# Patient Record
Sex: FEMALE | Race: WHITE | Employment: STUDENT | URBAN - METROPOLITAN AREA
[De-identification: names, ages, dates, MRNs, and addresses within clinical notes are randomized per-mention and may not be internally consistent; named-entity substitution may affect disease eponyms.]

---

## 2020-01-09 ENCOUNTER — PROCEDURE VISIT (OUTPATIENT)
Dept: SPORTS MEDICINE | Age: 20
End: 2020-01-09

## 2020-01-09 ASSESSMENT — PAIN SCALES - GENERAL: PAINLEVEL_OUTOF10: 4

## 2020-01-13 ENCOUNTER — PROCEDURE VISIT (OUTPATIENT)
Dept: SPORTS MEDICINE | Age: 20
End: 2020-01-13

## 2020-01-14 ENCOUNTER — OFFICE VISIT (OUTPATIENT)
Dept: ORTHOPEDIC SURGERY | Age: 20
End: 2020-01-14
Payer: COMMERCIAL

## 2020-01-14 VITALS — HEIGHT: 60 IN | BODY MASS INDEX: 23.16 KG/M2 | WEIGHT: 118 LBS

## 2020-01-14 PROCEDURE — 99203 OFFICE O/P NEW LOW 30 MIN: CPT | Performed by: ORTHOPAEDIC SURGERY

## 2020-01-14 RX ORDER — METHYLPREDNISOLONE 4 MG/1
TABLET ORAL
Qty: 1 KIT | Refills: 0 | Status: SHIPPED | OUTPATIENT
Start: 2020-01-14

## 2020-01-14 NOTE — PROGRESS NOTES
Chief Complaint    Hip Pain (left hip)      History of Present Illness:  Navi Paez is a 20 y.o. female. She is here today for evaluation of her left hip. She is a  here at [employer], [place] and she is had left hip pain that is been somewhat chronic but has gotten worse as of recently and changed in character. She states that she always has some anterior hip pain bilaterally with activity. As of recently she started having more pain over the lateral side of the hip. Pain is there with activity especially while she is figure skating. She denies radicular pain. Denies numbness or tingling. Denies any history of injury to the hips           Medical History:  Patient's medications, allergies, past medical, surgical, social and family histories were reviewed and updated as appropriate. Review of Systems:  Pertinent items are noted in HPI  Review of systems reviewed from Patient History Form dated on 1/14/20 and available in the patient's chart under the Media tab. Vital Signs:  Ht 5' (1.524 m)   Wt 118 lb (53.5 kg)   BMI 23.05 kg/m²     General Exam:   Constitutional: Patient is adequately groomed with no evidence of malnutrition  DTRs: Deep tendon reflexes are intact  Mental Status: The patient is oriented to time, place and person. The patient's mood and affect are appropriate. Hip Examination:    Inspection: No significant swelling erythema noted about the left hip today    Palpation: There is tenderness palpation over the trochanteric bursa of the left hip. No tenderness along the iliac crest.    Range of Motion: Passive hip flexion today is 120 degrees. She has good passive internal and external rotation. Strength: She does have good strength with resisted hip flexion as well as resisted abduction adduction    Special Tests: Negative Stinchfield.   Negative straight leg raise exam.  There is pain reproduction with hip impingement testing on the left hip    Skin: There are no rashes, ulcerations or lesions. Gait: Normal    Additional Comments:       Additional Examinations:         Right Lower Extremity: Examination of the right lower extremity does not show any tenderness, deformity or injury. Range of motion is unremarkable. There is no gross instability. There are no rashes, ulcerations or lesions. Strength and tone are normal.    Radiology:     X-rays obtained and reviewed in office:  Views 2 views left hip does demonstrate findings consistent with hip impingement with small cam lesion along the femoral head neck junction bilaterally and small pincer lesion along the acetabulum         Assessment : Left hip impingement, trochanteric bursitis    Impression:  Encounter Diagnoses   Name Primary?  Left hip pain Yes    Hip impingement syndrome, left     Trochanteric bursitis of left hip        Office Procedures:  Orders Placed This Encounter   Procedures    XR HIP LEFT (2-3 VIEWS)     Standing Status:   Future     Standing Expiration Date:   1/14/2021    Ambulatory referral to Physical Therapy     Referral Priority:   Routine     Referral Type:   Eval and Treat     Referral Reason:   Specialty Services Required     Requested Specialty:   Physical Therapy     Number of Visits Requested:   1       Treatment Plan: I discussed the diagnosis and treatment options with her today. She has a big tournament coming up this coming weekend that she would like to skate and therefore I am going to place her on to JORDY/ Paulie Beebe [address]. I am also going to refer her into physical therapy at this time. She may continue to skate as she can tolerate with her symptoms.   I would like to see her back in clinic in 6 weeks for follow-up

## 2020-01-14 NOTE — LETTER
[facility]  [address] [address]  Phone: [phone]  Fax: [phone]    Jacey Christine MD        January 14, 2020     Patient: Milton Boss   YOB: 2000   Date of Visit: 1/14/2020       To Whom It May Concern: It is my medical opinion that Milton Boss may continue ice skating. If you have any questions or concerns, please don't hesitate to call.     Sincerely,              Jacey Christien MD

## 2020-01-16 ENCOUNTER — PROCEDURE VISIT (OUTPATIENT)
Dept: SPORTS MEDICINE | Age: 20
End: 2020-01-16

## 2020-01-16 NOTE — PROGRESS NOTES
Subjective:      Patient ID: Ulises Andrade is a 20 y.o. female. Mary Martinez comes in today for a follow up to her Left hip pain. She was evaluated by Dr. Nidhi Marshall 2 days ago and diagnosed her with left hip impingement. She was perscribed an oral steroid which she reports has been helping reduce the pain and discomfort. Yesterday she reports shooting pain down into her knee from her hip but that has resolved today. She has a big competition this upcoming weekend so we will begin rehab exercises next week. We would like Bhargav to complete physical therapy with us and over at the physical therapy office once classes begin. Hip Pain          Review of Systems    Objective:   Physical Exam    Assessment:      Hip Impingement      Plan: Today we completed 20 minutes of electrical stimulation and ice. She tolerated treatment well. Continue to complete the remainder of her oral steroid. Begin formal therapy once classes begin in 2 weeks.          Nicki Gallagher

## 2020-01-31 ENCOUNTER — PROCEDURE VISIT (OUTPATIENT)
Dept: SPORTS MEDICINE | Age: 20
End: 2020-01-31

## 2020-01-31 NOTE — PROGRESS NOTES
Subjective:      Patient ID: Marni Mercer is a 20 y.o. female. Christopher Jacob comes in today for therapeutic exercises for her left hip impingement. She reports that her home PT worked a lot on loosening up her hip but that her joint pain is still present. She took about a week and a half off of skating. Right now she is only doing footwork and choreography but plans to start jumping soon since she wants to compete the first weekend of march. HPI    Review of Systems    Objective:   Physical Exam    Assessment:      L hip impingement      Plan: Today Bhargav did:  Bike 5 mins  Band assisted posterior hip mobs 2x10 each  [place] glute activation  Stool figure 4 stretch  Soft tissue massage around L trochanter    She attends her first PT session at [facility] on Tuesday. She will come in as needed on the days she is not at PT.         Tristan Armijo

## 2020-02-04 ENCOUNTER — HOSPITAL ENCOUNTER (OUTPATIENT)
Dept: PHYSICAL THERAPY | Age: 20
Setting detail: THERAPIES SERIES
Discharge: HOME | End: 2020-02-04
Payer: COMMERCIAL

## 2020-02-04 PROCEDURE — 97112 NEUROMUSCULAR REEDUCATION: CPT

## 2020-02-04 PROCEDURE — 97140 MANUAL THERAPY 1/> REGIONS: CPT

## 2020-02-04 PROCEDURE — 97161 PT EVAL LOW COMPLEX 20 MIN: CPT

## 2020-02-04 NOTE — PLAN OF CARE
[facility]  Phone: [phone]   Fax:     [phone]                                                       Physical Therapy Certification    Dear Referring Practitioner: Dr. Lalita Olmstead,    We had the pleasure of evaluating the following patient for physical therapy services at [address] [place] [address]. A summary of our findings can be found in the initial assessment below. This includes our plan of care. If you have any questions or concerns regarding these findings, please do not hesitate to contact me at the office phone number checked above. Thank you for the referral.       Physician Signature:_______________________________Date:__________________  By signing above (or electronic signature), therapists plan is approved by physician      Patient: Lori Pena   : 2000   MRN: [id number]  Referring Physician: Referring Practitioner: Dr. Lalita Olmstead      Evaluation Date: 2020      Medical Diagnosis Information:  Diagnosis: M25.852 Hip impingement syndrome; M70.62 Trochanteric bursitis of L hip   Treatment Diagnosis: L hip pain, decreased LLE strength                                         Insurance information: PT Insurance Information: BCBS; 60 visits; 90/10%     Precautions/ Contra-indications: n/a  Latex Allergy:  [x]NO      []YES  Preferred Language for Healthcare:   [x]English       []other:    SUBJECTIVE: Patient stated complaint: Pt reports for evaluation of L hip. 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 States at the beginning of January she fell hard after a jump and started having increased pain anterior and laterally even at rest just sitting or when she is standing/walking for too long. States she is still skating to tolerance but has not performed any jumps/turns in a few weeks.  Was getting PT back at home w/ ultrasound, stim, and some manual stretching that has helped pain decrease. States she has not had the anterior hip pain much the last few weeks and pain is primarily lateral hip over the greater trochanter, worse to touch. Pt states she falls a lot to her L side when skating which doesn't help. Relevant Medical History: n/a  Functional Scale/Score: LEFS: 55/80    Pain Scale: 2-3/10 currently, 7-8/10 w/ walking/skating  Easing factors: ice/heat, STM  Provocative factors: squatting, walking/skating    Type: []Constant   [x]Intermittent  []Radiating []Localized []other:     Numbness/Tingling: pt denies    Occupation/School: student     Living Status/Prior Level of Function: Independent with ADLs and IADLs    OBJECTIVE:     ROM LEFT RIGHT   HIP Flex WFL; p! End range & w/ OP WFL   HIP Abd [facility] WFL   HIP Ext [facility] WFL   HIP IR WFL WFL   Hip ER [facility] WFL   Knee ext     Knee Flex     Ankle PF     Ankle DF     Ankle In     Ankle Ev     Strength  LEFT RIGHT   HIP Flexors 4+ 5   HIP Abductors 4- 4   HIP Ext 4 4   Knee EXT (quad) 4+ 5   Knee Flex (HS) 5 5   Ankle DF 5 5   Ankle PF 5 5     Reflexes/Sensation:    [x]Dermatomes/Myotomes intact    [x]Reflexes equal and normal bilaterally   []Other:    Joint mobility: L hip; lumbar cleared w/o referral   []Normal    [x]Hypo   []Hyper    Palpation: TTP L greater trochanter, L glute med, L piriformis    Functional Mobility/Transfers: full squat w/ increased tightness lateral hip    Posture: WFL    Bandages/Dressings/Incisions: n/a    Gait: (include devices/WB status) WFL; decreased L hip extension and R pelvic drop    Orthopedic Special Tests:   + RAISSA, ER w/ OP,                        [x] Patient history, allergies, meds reviewed. Medical chart reviewed. See intake form. Review Of Systems (ROS):  [x]Performed Review of systems (Integumentary, CardioPulmonary, Neurological) by intake and observation. Intake form has been scanned into medical record.  Patient has been response w/ STM to glute med and piriformis and decreased pain w/ passive motion following inferior and lateral hip mobilizations. Quick fatigue w/ glute facilitation exercises prescribed for HEP. Pt to benefit from skilled PT to improve L hip mobility, flexibility, and glute strengthening and facilitation to improve stability and return to sport. Functional Impairments:     [x]Noted lumbar/proximal hip/LE hypomobility   []Decreased LE functional ROM   [x]Decreased core/proximal hip strength and neuromuscular control   [x]Decreased LE functional strength   []Reduced balance/proprioceptive control   []other:      Functional Activity Limitations (from functional questionnaire and intake)   [x]Reduced ability to tolerate prolonged functional positions   []Reduced ability or difficulty with changes of positions or transfers between positions   []Reduced ability to maintain good posture and demonstrate good body mechanics with sitting, bending, and lifting   []Reduced ability to sleep   [] Reduced ability or tolerance with driving and/or computer work   []Reduced ability to perform lifting, carrying tasks   [x]Reduced ability to squat   []Reduced ability to forward bend   [x]Reduced ability to ambulate prolonged functional periods/distances/surfaces   [x]Reduced ability to ascend/descend stairs   [x]Reduced ability to run, hop or jump   []other:     Participation Restrictions   []Reduced participation in self care activities   []Reduced participation in home management activities   []Reduced participation in work activities   []Reduced participation in social activities. [x]Reduced participation in sport activities. Classification :    []Signs/symptoms consistent with post-surgical status including decreased ROM, strength and function.    []Signs/symptoms consistent with joint sprain/strain   []Signs/symptoms consistent with patella-femoral syndrome   []Signs/symptoms consistent with knee OA/hip to include: Dry Needling/IASTM, STM, PROM, Gr I-IV mobilizations, manipulation. [x] Modalities as needed that may include: thermal agents, E-stim, Biofeedback, US, iontophoresis as indicated  [x] Patient education on joint protection, postural re-education, activity modification, progression of HEP. HEP instruction: (see scanned forms)    GOALS:  Patient stated goal: \"be able to jump/spin without pain\"  [] Progressing: [] Met: [x] Not Met: [] Adjusted    Therapist goals for Patient:   Short Term Goals: To be achieved in: 2 weeks  1. Independent in HEP and progression per patient tolerance, in order to prevent re-injury. [] Progressing: [] Met: [x] Not Met: [] Adjusted  2. Patient will have a decrease in pain to facilitate improvement in movement, function, and ADLs as indicated by Functional Deficits. [] Progressing: [] Met: [x] Not Met: [] Adjusted    Long Term Goals: To be achieved in: 8 weeks  1. Disability index score of 20% or less for the LEFS to assist with reaching prior level of function. [] Progressing: [] Met: [x] Not Met: [] Adjusted  2. Patient will demonstrate increased AROM to [facility] w/o increased pain  to allow for proper joint functioning as indicated by patients Functional Deficits. [] Progressing: [] Met: [x] Not Met: [] Adjusted  3. Patient will demonstrate an increase in Strength to good proximal hip strength and control, within 5lb HHD in LE to allow for proper functional mobility as indicated by patients Functional Deficits. [] Progressing: [] Met: [x] Not Met: [] Adjusted  4. Patient will return to walking functional activities without increased symptoms or restriction. [] Progressing: [] Met: [x] Not Met: [] Adjusted  5. Pt will be able to perform a jump with  Minimal sx and no restrictions.   [] Progressing: [] Met: [x] Not Met: [] Adjusted      Electronically signed by:  Shankar Del Toro PT

## 2020-02-04 NOTE — FLOWSHEET NOTE
to strengthening, flexibility, endurance, ROM of core, proximal hip and LE for functional self-care, mobility, lifting and ambulation/stair navigation   [] (02217)Reviewed/Progressed HEP activities related to improving balance, coordination, kinesthetic sense, posture, motor skill, proprioception of core, proximal hip and LE for self care, mobility, lifting, and ambulation/stair navigation      Manual Treatments:  PROM / STM / Oscillations-Mobs:  G-I, II, III, IV (PA's, Inf., Post.)  [x] (06841) Provided manual therapy to mobilize LE, proximal hip and/or LS spine soft tissue/joints for the purpose of modulating pain, promoting relaxation,  increasing ROM, reducing/eliminating soft tissue swelling/inflammation/restriction, improving soft tissue extensibility and allowing for proper ROM for normal function with self care, mobility, lifting and ambulation. Modalities:     [] GAME READY (VASO)- for significant edema, swelling, pain control. Charges:  Timed Code Treatment Minutes: 30   Total Treatment Minutes: 60      [x] EVAL (LOW) 86374 (typically 20 minutes face-to-face)  [] EVAL (MOD) 21567 (typically 30 minutes face-to-face)  [] EVAL (HIGH) 88410 (typically 45 minutes face-to-face)  [] RE-EVAL     [] CE(03914) x     [] IONTO (32100)  [x] NMR (24797) x 1    [] VASO (25408)  [x] Manual (35809) x 1    [] Other:  [] TA (81435)x     [] Mech Traction (71423)  [] ES(attended) (72946)     [] ES (un) (14744):       GOALS:   Patient stated goal: \"be able to jump/spin without pain\"  [] Progressing: [] Met: [x] Not Met: [] Adjusted    Therapist goals for Patient:   Short Term Goals: To be achieved in: 2 weeks  1. Independent in HEP and progression per patient tolerance, in order to prevent re-injury. [] Progressing: [] Met: [x] Not Met: [] Adjusted  2. Patient will have a decrease in pain to facilitate improvement in movement, function, and ADLs as indicated by Functional Deficits.   [] Progressing: [] Met: [x] Not Met: [] Adjusted    Long Term Goals: To be achieved in: 8 weeks  1. Disability index score of 20% or less for the LEFS to assist with reaching prior level of function. [] Progressing: [] Met: [x] Not Met: [] Adjusted  2. Patient will demonstrate increased AROM to [facility] w/o increased pain  to allow for proper joint functioning as indicated by patients Functional Deficits. [] Progressing: [] Met: [x] Not Met: [] Adjusted  3. Patient will demonstrate an increase in Strength to good proximal hip strength and control, within 5lb HHD in LE to allow for proper functional mobility as indicated by patients Functional Deficits. [] Progressing: [] Met: [x] Not Met: [] Adjusted  4. Patient will return to walking functional activities without increased symptoms or restriction. [] Progressing: [] Met: [x] Not Met: [] Adjusted  5. Pt will be able to perform a jump with  Minimal sx and no restrictions. [] Progressing: [] Met: [x] Not Met: [] Adjusted    ASSESSMENT:  See eval    Return to Play: (if applicable)   []  Stage 1: Intro to Strength   []  Stage 2: Return to Run and Strength   []  Stage 3: Return to Jump and Strength   []  Stage 4: Dynamic Strength and Agility   []  Stage 5: Sport Specific Training     []  Ready to Return to Play, Meets All Above Stages   []  Not Ready for Return to Sports   Comments:            Treatment/Activity Tolerance:  [x] Patient tolerated treatment well [] Patient limited by fatique  [] Patient limited by pain  [] Patient limited by other medical complications  [] Other:     Overall Progression Towards Functional goals/ Treatment Progress Update:  [] Patient is progressing as expected towards functional goals listed. [] Progression is slowed due to complexities/Impairments listed. [] Progression has been slowed due to co-morbidities.   [x] Plan just implemented, too soon to assess goals progression <30days   [] Goals require adjustment due to lack of progress  [] Patient is not

## 2020-02-06 ENCOUNTER — HOSPITAL ENCOUNTER (OUTPATIENT)
Dept: PHYSICAL THERAPY | Age: 20
Setting detail: THERAPIES SERIES
Discharge: HOME | End: 2020-02-06
Payer: COMMERCIAL

## 2020-02-06 PROCEDURE — 97110 THERAPEUTIC EXERCISES: CPT

## 2020-02-06 PROCEDURE — 97140 MANUAL THERAPY 1/> REGIONS: CPT

## 2020-02-06 PROCEDURE — 97112 NEUROMUSCULAR REEDUCATION: CPT

## 2020-02-06 NOTE — FLOWSHEET NOTE
[place] [address] [place] [address]  Phone: [phone] Fax: [phone]    Physical Therapy Treatment Note/ Progress Report:     Date:  2020    Patient Name:  Rosetta Villaseñor    :  2000  MRN: [id number]  Restrictions/Precautions:    Medical/Treatment Diagnosis Information:  · Diagnosis: M25.852 Hip impingement syndrome; M70.62 Trochanteric bursitis of L hip  · Treatment Diagnosis: L hip pain, decreased LLE strength  Insurance/Certification information:  PT Insurance Information: BCBS; 61 visits; 90/10%  Physician Information:  Referring Practitioner: Dr. Ambrosio Nixon of care signed (Y/N):     Date of Patient follow up with Physician: 2020     Progress Report: []  Yes  [x]  No     Date Range for reporting period:  Beginnin2020  Ending:      Progress report due (10 Rx/or 30 days whichever is less): 3/3/4848     Recertification due (POC duration/ or 90 days whichever is less): 3/31/2020     Visit # Insurance Allowable Auth Needed   2 56 []Yes   [x]No     Latex Allergy:  [x]NO      []YES  Preferred Language for Healthcare:   [x]English       []other:  Functional Scale: LEFS  Date assessed: 2020    Pain level:  2-3/10     SUBJECTIVE:  Pt reports some soreness lateral hip today. States she felt good the rest of the day Tuesday but had some increased lateral hip and lateral leg soreness yesterday that has since resolved.      OBJECTIVE: See eval   Observation:    Test measurements:      RESTRICTIONS/PRECAUTIONS: n/a    Exercises/Interventions:     Therapeutic Ex (11931)   Min: 20 Resistance Sets/sec Reps Notes/CUES   Retro Stepper/BIKE  5'     Alter G       BFR       Sportcord March       3 way SLR       SAQ       Clam ABD       Hip Ext Darroll Reap       BOSU fwd/side lunge       BOSU squat       Leg Press Con  Ecc  70#  50# 2  2 10  10    Cybex HS curl       TKE       Glute side walks       RDL       Slide Lunge- lateral slideboard 2 8    Slide HS eccentrics       Step ups/ecc step down 6'' 2 8 Slow/controlled w/ eccentric focus   Swissball wall rolls- in SLS- hip drive  2 5 bilateral   Quad hip ext/wall-ball rolls       Supine piriformis stretch  30'' 3           Manual Intervention (94870)  Min: 15       Knee mobs/PROM  3'  Glute/piriformis stretch   Tib/Fem Mobs       Patella Mobs       Ankle mobs       Hip belt mobs  10'  Lateral, lateral w/ IR bias, inferior; grade 2-3 as tolerated   LAD  2'  Grade 2-3          NMR re-education (66634)  Min: 10    CUES NEEDED   [place]/Biofeedback 10/10       Prone TKE  5'' 10 HEP   Prone froggers  5'' 10    G. Med activation- clams  2 10    Hip Ext full ROM/ G. Activation       Bosu Bal and Prop- G Med       Single leg stance/Balance/Prop       Bosu Retro G. Med act       Prone Hip froggers- sliders/elevated       Foam rolling   3'  Straight leg/bent leg   Therapeutic Activity (75232)  Min:       Ladders       Plyos       Dynamic Balance                                Therapeutic Exercise and NMR EXR  [x] (04869) Provided verbal/tactile cueing for activities related to strengthening, flexibility, endurance, ROM for improvements in LE, proximal hip, and core control with self care, mobility, lifting, ambulation. [x] (64547) Provided verbal/tactile cueing for activities related to improving balance, coordination, kinesthetic sense, posture, motor skill, proprioception  to assist with LE, proximal hip, and core control in self care, mobility, lifting, ambulation and eccentric single leg control.      NMR and Therapeutic Activities:    [x] (38258 or 57677) Provided verbal/tactile cueing for activities related to improving balance, coordination, kinesthetic sense, posture, motor skill, proprioception and motor activation to allow for proper function of core, proximal hip and LE with self care and ADLs and functional mobility.   [] (92933) Gait Re-education- Provided training and instruction to the patient for proper LE, core and proximal hip recruitment and positioning and eccentric body weight control with ambulation re-education including up and down stairs     Home Exercise Program:    [x] (59395) Reviewed/Progressed HEP activities related to strengthening, flexibility, endurance, ROM of core, proximal hip and LE for functional self-care, mobility, lifting and ambulation/stair navigation   [] (64867)Reviewed/Progressed HEP activities related to improving balance, coordination, kinesthetic sense, posture, motor skill, proprioception of core, proximal hip and LE for self care, mobility, lifting, and ambulation/stair navigation      Manual Treatments:  PROM / STM / Oscillations-Mobs:  G-I, II, III, IV (PA's, Inf., Post.)  [x] (98976) Provided manual therapy to mobilize LE, proximal hip and/or LS spine soft tissue/joints for the purpose of modulating pain, promoting relaxation,  increasing ROM, reducing/eliminating soft tissue swelling/inflammation/restriction, improving soft tissue extensibility and allowing for proper ROM for normal function with self care, mobility, lifting and ambulation. Modalities:     [] GAME READY (VASO)- for significant edema, swelling, pain control. Charges:  Timed Code Treatment Minutes: 45   Total Treatment Minutes: 45      [] EVAL (LOW) 26740 (typically 20 minutes face-to-face)  [] EVAL (MOD) 42427 (typically 30 minutes face-to-face)  [] EVAL (HIGH) 69026 (typically 45 minutes face-to-face)  [] RE-EVAL     [x] WG(23150) x  1   [] IONTO (83667)  [x] NMR (45979) x 1    [] VASO (06493)  [x] Manual (48212) x 1    [] Other:  [] TA (49247)x     [] Mech Traction (53595)  [] ES(attended) (24150)     [] ES (un) (30142):       GOALS:   Patient stated goal: \"be able to jump/spin without pain\"  [] Progressing: [] Met: [x] Not Met: [] Adjusted    Therapist goals for Patient:   Short Term Goals: To be achieved in: 2 weeks  1.  Independent in HEP and progression per Tolerance:  [x] Patient tolerated treatment well [] Patient limited by fatique  [] Patient limited by pain  [] Patient limited by other medical complications  [] Other:     Overall Progression Towards Functional goals/ Treatment Progress Update:  [] Patient is progressing as expected towards functional goals listed. [] Progression is slowed due to complexities/Impairments listed. [] Progression has been slowed due to co-morbidities. [x] Plan just implemented, too soon to assess goals progression <30days   [] Goals require adjustment due to lack of progress  [] Patient is not progressing as expected and requires additional follow up with physician  [] Other    Prognosis for POC: [x] Good [] Fair  [] Poor    Patient requires continued skilled intervention: [x] Yes  [] No        PLAN: Continue functional strength training and hip mobility as tolerated to improve eccentric landing control and overall proximal hip strength and stability  [x] Continue per plan of care [] Alter current plan (see comments)  [] Plan of care initiated [] Hold pending MD visit [] Discharge    Electronically signed by: Evie Santos PT    Note: If patient does not return for scheduled/recommended follow up visits, this note will serve as a discharge from care along with the most recent update on progress.

## 2020-02-10 ENCOUNTER — HOSPITAL ENCOUNTER (OUTPATIENT)
Dept: PHYSICAL THERAPY | Age: 20
Setting detail: THERAPIES SERIES
Discharge: HOME | End: 2020-02-10
Payer: COMMERCIAL

## 2020-02-10 PROCEDURE — 97110 THERAPEUTIC EXERCISES: CPT

## 2020-02-10 PROCEDURE — 97140 MANUAL THERAPY 1/> REGIONS: CPT

## 2020-02-10 PROCEDURE — 97112 NEUROMUSCULAR REEDUCATION: CPT

## 2020-02-10 NOTE — FLOWSHEET NOTE
and eccentric body weight control with ambulation re-education including up and down stairs     Home Exercise Program:    [x] (65788) Reviewed/Progressed HEP activities related to strengthening, flexibility, endurance, ROM of core, proximal hip and LE for functional self-care, mobility, lifting and ambulation/stair navigation   [] (73797)Reviewed/Progressed HEP activities related to improving balance, coordination, kinesthetic sense, posture, motor skill, proprioception of core, proximal hip and LE for self care, mobility, lifting, and ambulation/stair navigation      Manual Treatments:  PROM / STM / Oscillations-Mobs:  G-I, II, III, IV (PA's, Inf., Post.)  [x] (04024) Provided manual therapy to mobilize LE, proximal hip and/or LS spine soft tissue/joints for the purpose of modulating pain, promoting relaxation,  increasing ROM, reducing/eliminating soft tissue swelling/inflammation/restriction, improving soft tissue extensibility and allowing for proper ROM for normal function with self care, mobility, lifting and ambulation. Modalities:     [] GAME READY (VASO)- for significant edema, swelling, pain control. Charges:  Timed Code Treatment Minutes: 45   Total Treatment Minutes: 45      [] EVAL (LOW) 98464 (typically 20 minutes face-to-face)  [] EVAL (MOD) 27626 (typically 30 minutes face-to-face)  [] EVAL (HIGH) 88666 (typically 45 minutes face-to-face)  [] RE-EVAL     [x] SY(95806) x  1   [] IONTO (48586)  [x] NMR (92351) x 1    [] VASO (74378)  [x] Manual (85091) x 1    [] Other:  [] TA (31203)x     [] [facility] (65444)  [] ES(attended) (41595)     [] ES (un) (41432):       GOALS:   Patient stated goal: \"be able to jump/spin without pain\"  [] Progressing: [] Met: [x] Not Met: [] Adjusted    Therapist goals for Patient:   Short Term Goals: To be achieved in: 2 weeks  1. Independent in HEP and progression per patient tolerance, in order to prevent re-injury.    [] Progressing: [] Met: [x] Not Met: [] Adjusted  2. Patient will have a decrease in pain to facilitate improvement in movement, function, and ADLs as indicated by Functional Deficits. [] Progressing: [] Met: [x] Not Met: [] Adjusted    Long Term Goals: To be achieved in: 8 weeks  1. Disability index score of 20% or less for the LEFS to assist with reaching prior level of function. [] Progressing: [] Met: [x] Not Met: [] Adjusted  2. Patient will demonstrate increased AROM to [facility] w/o increased pain  to allow for proper joint functioning as indicated by patients Functional Deficits. [] Progressing: [] Met: [x] Not Met: [] Adjusted  3. Patient will demonstrate an increase in Strength to good proximal hip strength and control, within 5lb HHD in LE to allow for proper functional mobility as indicated by patients Functional Deficits. [] Progressing: [] Met: [x] Not Met: [] Adjusted  4. Patient will return to walking functional activities without increased symptoms or restriction. [] Progressing: [] Met: [x] Not Met: [] Adjusted  5. Pt will be able to perform a jump with  Minimal sx and no restrictions. [] Progressing: [] Met: [x] Not Met: [] Adjusted    ASSESSMENT:  Pt tolerated treatment well today. Intermittent cues required throughout session to decrease valgus position on LLE. Pt w/ increased difficulty w/ eccentric control on steps at end range. Pt to benefit from skilled PT to improve L hip mobility and functional strength.      Return to Play: (if applicable)   []  Stage 1: Intro to Strength   []  Stage 2: Return to Run and Strength   []  Stage 3: Return to Jump and Strength   []  Stage 4: Dynamic Strength and Agility   []  Stage 5: Sport Specific Training     []  Ready to Return to Play, Meets All Above Stages   []  Not Ready for Return to Sports   Comments:            Treatment/Activity Tolerance:  [x] Patient tolerated treatment well [] Patient limited by fatique  [] Patient limited by pain  [] Patient limited by other medical complications  [] Other:     Overall Progression Towards Functional goals/ Treatment Progress Update:  [] Patient is progressing as expected towards functional goals listed. [] Progression is slowed due to complexities/Impairments listed. [] Progression has been slowed due to co-morbidities. [x] Plan just implemented, too soon to assess goals progression <30days   [] Goals require adjustment due to lack of progress  [] Patient is not progressing as expected and requires additional follow up with physician  [] Other    Prognosis for POC: [x] Good [] Fair  [] Poor    Patient requires continued skilled intervention: [x] Yes  [] No        PLAN: Continue functional strength training and hip mobility as tolerated to improve eccentric landing control and overall proximal hip strength and stability  [x] Continue per plan of care [] Alter current plan (see comments)  [] Plan of care initiated [] Hold pending MD visit [] Discharge    Electronically signed by: Ac Bautista PTA    Note: If patient does not return for scheduled/recommended follow up visits, this note will serve as a discharge from care along with the most recent update on progress.

## 2020-02-13 ENCOUNTER — HOSPITAL ENCOUNTER (OUTPATIENT)
Dept: PHYSICAL THERAPY | Age: 20
Setting detail: THERAPIES SERIES
Discharge: HOME | End: 2020-02-13
Payer: COMMERCIAL

## 2020-02-13 PROCEDURE — 97112 NEUROMUSCULAR REEDUCATION: CPT

## 2020-02-13 PROCEDURE — 97140 MANUAL THERAPY 1/> REGIONS: CPT

## 2020-02-13 NOTE — FLOWSHEET NOTE
[place] [address] [facility]  Phone: [phone] Fax: [phone]    Physical Therapy Treatment Note/ Progress Report:     Date:  2020    Patient Name:  Trevon Scott    :  2000  MRN: [id number]  Restrictions/Precautions:    Medical/Treatment Diagnosis Information:  · Diagnosis: M25.852 Hip impingement syndrome; M70.62 Trochanteric bursitis of L hip  · Treatment Diagnosis: L hip pain, decreased LLE strength  Insurance/Certification information:  PT Insurance Information: BCBS; 61 visits; 90/10%  Physician Information:  Referring Practitioner: Dr. Ramirez Fee of care signed (Y/N):     Date of Patient follow up with Physician: 2020     Progress Report: []  Yes  [x]  No     Date Range for reporting period:  Beginnin2020  Ending:      Progress report due (10 Rx/or 30 days whichever is less):      Recertification due (POC duration/ or 90 days whichever is less): 3/31/2020     Visit # Insurance Allowable Auth Needed   4 56 []Yes   [x]No     Latex Allergy:  [x]NO      []YES  Preferred Language for Healthcare:   [x]English       []other:  Functional Scale: LEFS  Date assessed: 2020    Pain level:  2-3/10     SUBJECTIVE:  Patient reports that her pain is decreasing in deep hip. Continues to have lateral hip pain but states she has been skating and training more recently 2/2 upcoming competition.     OBJECTIVE: See eval   Observation:    Test measurements:      RESTRICTIONS/PRECAUTIONS: n/a    Exercises/Interventions:     Therapeutic Ex (44775)   Min:  Resistance Sets/sec Reps Notes/CUES   Retro Stepper/BIKE  5'     Alter G       BFR       Sportcord March       3 way SLR       SAQ       Clam ABD       Hip Ext Rito Orf       BOSU fwd/side lunge       BOSU squat       Leg Press Con  Ecc  70#  50# 2  2 10  10    Cybex HS curl       TKE       Glute side walks       RDL 10# 2 10    Slide Lunge- lateral slideboard 2 8    Slide HS eccentrics       Step ups/ecc step down 6'' 2 8 Slow/controlled w/ eccentric focus   Swissball wall rolls- in SLS- hip drive       Quad hip ext/wall-ball rolls       Supine piriformis stretch  30'' 3           Manual Intervention (52362)  Min: 25       Knee mobs/PROM  8'  Supine hip stretch into IR w/ ball at posterior femoral head   IASTM HGPro 15'  L IT band, L hip ERs, L glute med   Patella Mobs       Ankle mobs       LAD  2'  Grade 2-3          NMR re-education (47880)  Min: 15    CUES NEEDED   Bosu bridge  2 10 Cues to prevent lumbar extension and excessive anterior pelvis tilt   Prone TKE  5'' 10 Performed today 2/2 difficulty w/ quad compensation   Prone froggers  5'' 10    G. Med activation- clams  2 10    Hip Ext full ROM/ G. Activation       Bosu Bal and Prop- G Med [place] ball 2 5 Requires cues to fire glute med to prevent pelvic drop on stance leg   Single leg stance/Balance/Prop       Bosu Retro G. Med act       Prone Hip froggers- sliders/elevated       Foam rolling   3'  Straight leg/bent leg   Therapeutic Activity (02268)  Min:       Ladders       Plyos       Dynamic Balance                                Therapeutic Exercise and NMR EXR  [x] (46067) Provided verbal/tactile cueing for activities related to strengthening, flexibility, endurance, ROM for improvements in LE, proximal hip, and core control with self care, mobility, lifting, ambulation. [x] (87582) Provided verbal/tactile cueing for activities related to improving balance, coordination, kinesthetic sense, posture, motor skill, proprioception  to assist with LE, proximal hip, and core control in self care, mobility, lifting, ambulation and eccentric single leg control.      NMR and Therapeutic Activities:    [x] (61193 or 86718) Provided verbal/tactile cueing for activities related to improving balance, coordination, kinesthetic sense, posture, motor skill, proprioception and motor activation to allow for Strength   []  Stage 4: Dynamic Strength and Agility   []  Stage 5: Sport Specific Training     []  Ready to Return to Play, Meets All Above Stages   []  Not Ready for Return to Sports   Comments:            Treatment/Activity Tolerance:  [x] Patient tolerated treatment well [] Patient limited by fatique  [] Patient limited by pain  [] Patient limited by other medical complications  [] Other:     Overall Progression Towards Functional goals/ Treatment Progress Update:  [] Patient is progressing as expected towards functional goals listed. [] Progression is slowed due to complexities/Impairments listed. [] Progression has been slowed due to co-morbidities. [x] Plan just implemented, too soon to assess goals progression <30days   [] Goals require adjustment due to lack of progress  [] Patient is not progressing as expected and requires additional follow up with physician  [] Other    Prognosis for POC: [x] Good [] Fair  [] Poor    Patient requires continued skilled intervention: [x] Yes  [] No        PLAN: Continue functional strength training and hip mobility as tolerated to improve eccentric landing control and overall proximal hip strength and stability  [x] Continue per plan of care [] Alter current plan (see comments)  [] Plan of care initiated [] Hold pending MD visit [] Discharge    Electronically signed by: Ezequiel Reese PT    Note: If patient does not return for scheduled/recommended follow up visits, this note will serve as a discharge from care along with the most recent update on progress.

## 2020-02-17 ENCOUNTER — HOSPITAL ENCOUNTER (OUTPATIENT)
Dept: PHYSICAL THERAPY | Age: 20
Setting detail: THERAPIES SERIES
Discharge: HOME | End: 2020-02-17
Payer: COMMERCIAL

## 2020-02-17 PROCEDURE — 97112 NEUROMUSCULAR REEDUCATION: CPT

## 2020-02-17 PROCEDURE — 97140 MANUAL THERAPY 1/> REGIONS: CPT

## 2020-02-17 NOTE — FLOWSHEET NOTE
[facility] [address] [address]  Phone: [phone] Fax: [phone]    Physical Therapy Treatment Note/ Progress Report:     Date:  2020    Patient Name:  Katlyn Villanueva    :  2000  MRN: [id number]  Restrictions/Precautions:    Medical/Treatment Diagnosis Information:  · Diagnosis: M25.852 Hip impingement syndrome; M70.62 Trochanteric bursitis of L hip  · Treatment Diagnosis: L hip pain, decreased LLE strength  Insurance/Certification information:  PT Insurance Information: BCBS; 61 visits; 90/10%  Physician Information:  Referring Practitioner: Dr. Fabian Dick of care signed (Y/N):     Date of Patient follow up with Physician: 2020     Progress Report: []  Yes  [x]  No     Date Range for reporting period:  Beginnin2020  Ending:      Progress report due (10 Rx/or 30 days whichever is less): 376     Recertification due (POC duration/ or 90 days whichever is less): 3/31/2020     Visit # Insurance Allowable Auth Needed   5 56 []Yes   [x]No     Latex Allergy:  [x]NO      []YES  Preferred Language for Healthcare:   [x]English       []other:  Functional Scale: LEFS  Date assessed: 2020    Pain level:  2-3/10     SUBJECTIVE:  Patient reports that her hip was feeling better but she did a lot of skating over the weekend. States that she had to shovel the ice at the hockey game. Patient also states that she has returned to jumping on the ice and has a lot of pain with falling.      OBJECTIVE: See eval   Observation:    Test measurements:      RESTRICTIONS/PRECAUTIONS: n/a    Exercises/Interventions:     Therapeutic Ex (49142)   Min:  Resistance Sets/sec Reps Notes/CUES   Retro Stepper/BIKE  5'     Alter G       BFR       Sportcord March       3 way SLR       SAQ       Clam ABD       Hip Ext Brittny Loupe       BOSU fwd/side lunge       BOSU squat       Leg Press Con  Ecc  70#  50# 2  2 10  10 Cybex HS curl       TKE       Glute side walks       RDL 10# 2 10    Slide Lunge- lateral slideboard 2 8    Slide HS eccentrics       Step ups/ecc step down 6'' 2 8 Slow/controlled w/ eccentric focus   Swissball wall rolls- in SLS- hip drive       Quad hip ext/wall-ball rolls       Supine piriformis stretch  30'' 3           Manual Intervention (70905)  Min: 25       Knee mobs/PROM  8'  Supine hip stretch into IR w/ ball at posterior femoral head   IASTM HGPro 15'  L IT band, L hip ERs, L glute med   Patella Mobs       Ankle mobs       LAD  2'  Grade 2-3          NMR re-education (57796)  Min: 15    CUES NEEDED   Bosu bridge  2 10 Cues to prevent lumbar extension and excessive anterior pelvis tilt   Prone TKE  5'' 10 Performed today 2/2 difficulty w/ quad compensation   Prone froggers  5'' 10    G. Med activation- clams  2 10    Hip Ext full ROM/ G. Activation       Bosu Bal and Prop- G Med [place] ball 2 5 Requires cues to fire glute med to prevent pelvic drop on stance leg   Single leg stance/Balance/Prop       Bosu Retro G. Med act       Prone Hip froggers- sliders/elevated       Foam rolling   3'  Straight leg/bent leg   Therapeutic Activity (80239)  Min:       Ladders       Plyos       Dynamic Balance                                Therapeutic Exercise and NMR EXR  [x] (19904) Provided verbal/tactile cueing for activities related to strengthening, flexibility, endurance, ROM for improvements in LE, proximal hip, and core control with self care, mobility, lifting, ambulation. [x] (98770) Provided verbal/tactile cueing for activities related to improving balance, coordination, kinesthetic sense, posture, motor skill, proprioception  to assist with LE, proximal hip, and core control in self care, mobility, lifting, ambulation and eccentric single leg control.      NMR and Therapeutic Activities:    [x] (66915 or 52568) Provided verbal/tactile cueing for activities related to improving balance, coordination, kinesthetic sense, posture, motor skill, proprioception and motor activation to allow for proper function of core, proximal hip and LE with self care and ADLs and functional mobility.   [] (82842) Gait Re-education- Provided training and instruction to the patient for proper LE, core and proximal hip recruitment and positioning and eccentric body weight control with ambulation re-education including up and down stairs     Home Exercise Program:    [x] (14528) Reviewed/Progressed HEP activities related to strengthening, flexibility, endurance, ROM of core, proximal hip and LE for functional self-care, mobility, lifting and ambulation/stair navigation   [] (20770)Reviewed/Progressed HEP activities related to improving balance, coordination, kinesthetic sense, posture, motor skill, proprioception of core, proximal hip and LE for self care, mobility, lifting, and ambulation/stair navigation      Manual Treatments:  PROM / STM / Oscillations-Mobs:  G-I, II, III, IV (PA's, Inf., Post.)  [x] (10964) Provided manual therapy to mobilize LE, proximal hip and/or LS spine soft tissue/joints for the purpose of modulating pain, promoting relaxation,  increasing ROM, reducing/eliminating soft tissue swelling/inflammation/restriction, improving soft tissue extensibility and allowing for proper ROM for normal function with self care, mobility, lifting and ambulation. Modalities:     [] GAME READY (VASO)- for significant edema, swelling, pain control.      Charges:  Timed Code Treatment Minutes: 45   Total Treatment Minutes: 45      [] EVAL (LOW) 00637 (typically 20 minutes face-to-face)  [] EVAL (MOD) 15254 (typically 30 minutes face-to-face)  [] EVAL (HIGH) 85684 (typically 45 minutes face-to-face)  [] RE-EVAL     [] DA(49898) x     [] IONTO (24591)  [x] NMR (32255) x 1    [] VASO (22841)  [x] Manual (58603) x 2    [] Other  [] TA (88682)x     [] Mech Traction (48517)  [] ES(attended) (57172)     [] ES (un) (29007):       GOALS: Patient stated goal: \"be able to jump/spin without pain\"  [] Progressing: [] Met: [x] Not Met: [] Adjusted    Therapist goals for Patient:   Short Term Goals: To be achieved in: 2 weeks  1. Independent in HEP and progression per patient tolerance, in order to prevent re-injury. [] Progressing: [] Met: [x] Not Met: [] Adjusted  2. Patient will have a decrease in pain to facilitate improvement in movement, function, and ADLs as indicated by Functional Deficits. [] Progressing: [] Met: [x] Not Met: [] Adjusted    Long Term Goals: To be achieved in: 8 weeks  1. Disability index score of 20% or less for the LEFS to assist with reaching prior level of function. [] Progressing: [] Met: [x] Not Met: [] Adjusted  2. Patient will demonstrate increased AROM to [facility] w/o increased pain  to allow for proper joint functioning as indicated by patients Functional Deficits. [] Progressing: [] Met: [x] Not Met: [] Adjusted  3. Patient will demonstrate an increase in Strength to good proximal hip strength and control, within 5lb HHD in LE to allow for proper functional mobility as indicated by patients Functional Deficits. [] Progressing: [] Met: [x] Not Met: [] Adjusted  4. Patient will return to walking functional activities without increased symptoms or restriction. [] Progressing: [] Met: [x] Not Met: [] Adjusted  5. Pt will be able to perform a jump with  Minimal sx and no restrictions. [] Progressing: [] Met: [x] Not Met: [] Adjusted    ASSESSMENT:  Fair tolerance to treatment today. Limited by hip pain/soreness. Fatigue noted at conclusion but not much relief.      Return to Play: (if applicable)   []  Stage 1: Intro to Strength   []  Stage 2: Return to Run and Strength   []  Stage 3: Return to Jump and Strength   []  Stage 4: Dynamic Strength and Agility   []  Stage 5: Sport Specific Training     []  Ready to Return to Play, Meets All Above Stages   []  Not Ready for Return to Sports   Comments: Treatment/Activity Tolerance:  [x] Patient tolerated treatment well [] Patient limited by fatique  [] Patient limited by pain  [] Patient limited by other medical complications  [] Other:     Overall Progression Towards Functional goals/ Treatment Progress Update:  [] Patient is progressing as expected towards functional goals listed. [] Progression is slowed due to complexities/Impairments listed. [] Progression has been slowed due to co-morbidities. [x] Plan just implemented, too soon to assess goals progression <30days   [] Goals require adjustment due to lack of progress  [] Patient is not progressing as expected and requires additional follow up with physician  [] Other    Prognosis for POC: [x] Good [] Fair  [] Poor    Patient requires continued skilled intervention: [x] Yes  [] No        PLAN: Continue functional strength training and hip mobility as tolerated to improve eccentric landing control and overall proximal hip strength and stability  [x] Continue per plan of care [] Alter current plan (see comments)  [] Plan of care initiated [] Hold pending MD visit [] Discharge    Electronically signed by: Cecile Frost PTA    Note: If patient does not return for scheduled/recommended follow up visits, this note will serve as a discharge from care along with the most recent update on progress.

## 2020-02-20 ENCOUNTER — HOSPITAL ENCOUNTER (OUTPATIENT)
Dept: PHYSICAL THERAPY | Age: 20
Setting detail: THERAPIES SERIES
Discharge: HOME | End: 2020-02-20
Payer: COMMERCIAL

## 2020-02-20 PROCEDURE — 97140 MANUAL THERAPY 1/> REGIONS: CPT

## 2020-02-20 PROCEDURE — 97110 THERAPEUTIC EXERCISES: CPT

## 2020-02-20 PROCEDURE — 97112 NEUROMUSCULAR REEDUCATION: CPT

## 2020-02-20 NOTE — FLOWSHEET NOTE
[facility] [address] [address]  Phone: [phone] Fax: [phone]    Physical Therapy Treatment Note/ Progress Report:     Date:  2020    Patient Name:  Kvng Worthington    :  2000  MRN: [id number]  Restrictions/Precautions:    Medical/Treatment Diagnosis Information:  · Diagnosis: M25.852 Hip impingement syndrome; M70.62 Trochanteric bursitis of L hip  · Treatment Diagnosis: L hip pain, decreased LLE strength  Insurance/Certification information:  PT Insurance Information: BCBS; 61 visits; 90/10%  Physician Information:  Referring Practitioner: Dr. Michelle Sy of care signed (Y/N):     Date of Patient follow up with Physician: 2020     Progress Report: []  Yes  [x]  No     Date Range for reporting period:  Beginnin2020  Ending:      Progress report due (10 Rx/or 30 days whichever is less):      Recertification due (POC duration/ or 90 days whichever is less): 3/31/2020     Visit # Insurance Allowable Auth Needed   6 56 []Yes   [x]No     Latex Allergy:  [x]NO      []YES  Preferred Language for Healthcare:   [x]English       []other:  Functional Scale: LEFS  Date assessed: 2020    Pain level:  2/10     SUBJECTIVE:  Patient reports that he hip is feeling better overall. States lateral hip pain is definitely better but does endorse increased tightness down IT band closer to the knee. Feels like strengthening is helping stability and decreased strain on greater trochanter.      OBJECTIVE: See eval   Observation:    Test measurements:      RESTRICTIONS/PRECAUTIONS: n/a    Exercises/Interventions:     Therapeutic Ex (14160)   Min: 30 Resistance Sets/sec Reps Notes/CUES   Retro Stepper/BIKE  5'     Alter G       BFR       Sportcord March       3 way SLR       SAQ       Clam ABD  2 10 Add band next visit   Hip Ext Joy Lantig[place]       BOSU fwd/side lunge       BOSU bridge w/ band pull down yellow 2 8    Leg Press Con  Ecc  70#  50# 2  2 10  10    Cybex HS curl       TKE       Glute side walks [place] + orange at ankle 2 10    RDL 10# 2 10    Slide Lunge- lateral slideboard 2 8    Slide HS eccentrics       Step ups/ecc step down 6'' 2 8 Slow/controlled w/ eccentric focus   Swissball wall rolls- in SLS- hip drive       Quad hip ext/wall-ball rolls       Supine piriformis stretch  30'' 3           Manual Intervention (38546)  Min: 15       Knee mobs/PROM  IASTM HGPro 15'  L IT band, L hip ERs, L glute med   Patella Mobs       Ankle mobs       LAD  2'  Grade 2-3          NMR re-education (12067)  Min: 15    CUES NEEDED   Bosu bridge  2 10 Cues to prevent lumbar extension and excessive anterior pelvis tilt   Prone TKE       Prone froggers  5'' 10    G. Med activation- clams       Hip Ext full ROM/ G. Activation       Bosu Bal and Prop- G Med [place] ball 2 5 Requires cues to fire glute med to prevent pelvic drop on stance leg   Single leg stance/Balance/Prop       Bosu Retro G. Med act       Prone Hip froggers- sliders/elevated       Foam rolling   3'  Straight leg/bent leg   Therapeutic Activity (11390)  Min:       Ladders       Plyos       Dynamic Balance                                Therapeutic Exercise and NMR EXR  [x] (48315) Provided verbal/tactile cueing for activities related to strengthening, flexibility, endurance, ROM for improvements in LE, proximal hip, and core control with self care, mobility, lifting, ambulation. [x] (97702) Provided verbal/tactile cueing for activities related to improving balance, coordination, kinesthetic sense, posture, motor skill, proprioception  to assist with LE, proximal hip, and core control in self care, mobility, lifting, ambulation and eccentric single leg control.      NMR and Therapeutic Activities:    [x] (54756 or 19490) Provided verbal/tactile cueing for activities related to improving balance, coordination, kinesthetic sense, posture, motor skill, proprioception and motor activation to allow for proper function of core, proximal hip and LE with self care and ADLs and functional mobility.   [] (43217) Gait Re-education- Provided training and instruction to the patient for proper LE, core and proximal hip recruitment and positioning and eccentric body weight control with ambulation re-education including up and down stairs     Home Exercise Program:    [x] (85080) Reviewed/Progressed HEP activities related to strengthening, flexibility, endurance, ROM of core, proximal hip and LE for functional self-care, mobility, lifting and ambulation/stair navigation   [] (70454)Reviewed/Progressed HEP activities related to improving balance, coordination, kinesthetic sense, posture, motor skill, proprioception of core, proximal hip and LE for self care, mobility, lifting, and ambulation/stair navigation      Manual Treatments:  PROM / STM / Oscillations-Mobs:  G-I, II, III, IV (PA's, Inf., Post.)  [x] (58629) Provided manual therapy to mobilize LE, proximal hip and/or LS spine soft tissue/joints for the purpose of modulating pain, promoting relaxation,  increasing ROM, reducing/eliminating soft tissue swelling/inflammation/restriction, improving soft tissue extensibility and allowing for proper ROM for normal function with self care, mobility, lifting and ambulation. Modalities:     [] GAME READY (VASO)- for significant edema, swelling, pain control.      Charges:  Timed Code Treatment Minutes: 45   Total Treatment Minutes: 45      [] EVAL (LOW) 40933 (typically 20 minutes face-to-face)  [] EVAL (MOD) 04323 (typically 30 minutes face-to-face)  [] EVAL (HIGH) 76961 (typically 45 minutes face-to-face)  [] RE-EVAL     [x] AJ(58138) x 1    [] IONTO (49643)  [x] NMR (39423) x 1    [] VASO (41839)  [x] Manual (25201) x 1    [] Other  [] TA (41228)x     [] Mech Traction (54764)  [] ES(attended) (71060)     [] ES (un) (66197):       GOALS:   Patient stated goal: \"be able to jump/spin without

## 2020-02-24 ENCOUNTER — HOSPITAL ENCOUNTER (OUTPATIENT)
Dept: PHYSICAL THERAPY | Age: 20
Setting detail: THERAPIES SERIES
Discharge: HOME | End: 2020-02-24
Payer: COMMERCIAL

## 2020-02-24 PROCEDURE — 97110 THERAPEUTIC EXERCISES: CPT

## 2020-02-24 PROCEDURE — 97140 MANUAL THERAPY 1/> REGIONS: CPT

## 2020-02-24 PROCEDURE — 97112 NEUROMUSCULAR REEDUCATION: CPT

## 2020-02-24 NOTE — FLOWSHEET NOTE
[facility] [address] [address]  Phone: [phone] Fax: [phone]    Physical Therapy Treatment Note/ Progress Report:     Date:  2020    Patient Name:  Nadeem Hammer    :  2000  MRN: [id number]  Restrictions/Precautions:    Medical/Treatment Diagnosis Information:  · Diagnosis: M25.852 Hip impingement syndrome; M70.62 Trochanteric bursitis of L hip  · Treatment Diagnosis: L hip pain, decreased LLE strength  Insurance/Certification information:  PT Insurance Information: BCBS; 61 visits; 90/10%  Physician Information:  Referring Practitioner: Dr. Dima Adler of care signed (Y/N):     Date of Patient follow up with Physician: 2020     Progress Report: []  Yes  [x]  No     Date Range for reporting period:  Beginnin2020  Ending:      Progress report due (10 Rx/or 30 days whichever is less): 1745     Recertification due (POC duration/ or 90 days whichever is less): 3/31/2020     Visit # Insurance Allowable Auth Needed   7 56 []Yes   [x]No     Latex Allergy:  [x]NO      []YES  Preferred Language for Healthcare:   [x]English       []other:  Functional Scale: LEFS  Date assessed: 2020    Pain level:  210     SUBJECTIVE:  Patient reports that she has mild soreness today from exhibition practice but states that it went better than expected. Feels that she is making progress.      OBJECTIVE: See eval   Observation:    Test measurements:      RESTRICTIONS/PRECAUTIONS: n/a    Exercises/Interventions:     Therapeutic Ex (87974)   Min: 30 Resistance Sets/sec Reps Notes/CUES   Retro Stepper/BIKE  5'     Alter G       BFR       Sportcord March       3 way SLR       SAQ       Clam ABD  2 10 Add band next visit   Hip Ext /table       BOSU fwd/side lunge       BOSU bridge w/ band pull down yellow 2 8    Leg Press Con  Ecc  70#  50# 2  2 10  10    Cybex HS curl       TKE       Glute side walks [employer] + orange at ankle 2 10    RDL 10# 2 10    Slide Lunge- lateral slideboard 2 8    Slide HS eccentrics       Step ups/ecc step down 6'' 2 8 Slow/controlled w/ eccentric focus   Swissball wall rolls- in SLS- hip drive       Quad hip ext/wall-ball rolls       Supine piriformis stretch  30'' 3           Manual Intervention (71469)  Min: 15       Knee mobs/PROM  IASTM HGPro 15'  L IT band, L hip ERs, L glute med   Patella Mobs       Ankle mobs       LAD  2'  Grade 2-3          NMR re-education (00583)  Min: 15    CUES NEEDED   Bosu bridge  2 10 Cues to prevent lumbar extension and excessive anterior pelvis tilt   Prone TKE       Prone froggers  5'' 10    G. Med activation- clams       Hip Ext full ROM/ G. Activation       Bosu Bal and Prop- G Med [place] ball 2 5 Requires cues to fire glute med to prevent pelvic drop on stance leg   Single leg stance/Balance/Prop       Bosu Retro G. Med act       Prone Hip froggers- sliders/elevated       Foam rolling   3'  Straight leg/bent leg   Therapeutic Activity (50240)  Min:       Ladders       Plyos       Dynamic Balance                                Therapeutic Exercise and NMR EXR  [x] (54947) Provided verbal/tactile cueing for activities related to strengthening, flexibility, endurance, ROM for improvements in LE, proximal hip, and core control with self care, mobility, lifting, ambulation. [x] (98524) Provided verbal/tactile cueing for activities related to improving balance, coordination, kinesthetic sense, posture, motor skill, proprioception  to assist with LE, proximal hip, and core control in self care, mobility, lifting, ambulation and eccentric single leg control.      NMR and Therapeutic Activities:    [x] (69368 or 38148) Provided verbal/tactile cueing for activities related to improving balance, coordination, kinesthetic sense, posture, motor skill, proprioception and motor activation to allow for proper function of core, proximal hip and LE with self care and ADLs Goals: To be achieved in: 2 weeks  1. Independent in HEP and progression per patient tolerance, in order to prevent re-injury. [] Progressing: [] Met: [x] Not Met: [] Adjusted  2. Patient will have a decrease in pain to facilitate improvement in movement, function, and ADLs as indicated by Functional Deficits. [] Progressing: [] Met: [x] Not Met: [] Adjusted    Long Term Goals: To be achieved in: 8 weeks  1. Disability index score of 20% or less for the LEFS to assist with reaching prior level of function. [] Progressing: [] Met: [x] Not Met: [] Adjusted  2. Patient will demonstrate increased AROM to [facility] w/o increased pain  to allow for proper joint functioning as indicated by patients Functional Deficits. [] Progressing: [] Met: [x] Not Met: [] Adjusted  3. Patient will demonstrate an increase in Strength to good proximal hip strength and control, within 5lb HHD in LE to allow for proper functional mobility as indicated by patients Functional Deficits. [] Progressing: [] Met: [x] Not Met: [] Adjusted  4. Patient will return to walking functional activities without increased symptoms or restriction. [] Progressing: [] Met: [x] Not Met: [] Adjusted  5. Pt will be able to perform a jump with  Minimal sx and no restrictions. [] Progressing: [] Met: [x] Not Met: [] Adjusted    ASSESSMENT:  Good tolerance to treatment. No complaints of pain with any exercises.      Return to Play: (if applicable)   []  Stage 1: Intro to Strength   []  Stage 2: Return to Run and Strength   []  Stage 3: Return to Jump and Strength   []  Stage 4: Dynamic Strength and Agility   []  Stage 5: Sport Specific Training     []  Ready to Return to Play, Meets All Above Stages   []  Not Ready for Return to Sports   Comments:            Treatment/Activity Tolerance:  [x] Patient tolerated treatment well [] Patient limited by fatique  [] Patient limited by pain  [] Patient limited by other medical complications  [] Other:     Overall Progression Towards Functional goals/ Treatment Progress Update:  [] Patient is progressing as expected towards functional goals listed. [] Progression is slowed due to complexities/Impairments listed. [] Progression has been slowed due to co-morbidities. [x] Plan just implemented, too soon to assess goals progression <30days   [] Goals require adjustment due to lack of progress  [] Patient is not progressing as expected and requires additional follow up with physician  [] Other    Prognosis for POC: [x] Good [] Fair  [] Poor    Patient requires continued skilled intervention: [x] Yes  [] No        PLAN: Continue functional strength training and hip mobility as tolerated to improve eccentric landing control and overall proximal hip strength and stability  [x] Continue per plan of care [] Alter current plan (see comments)  [] Plan of care initiated [] Hold pending MD visit [] Discharge    Electronically signed by: Yassine Alatorre PTA      Note: If patient does not return for scheduled/recommended follow up visits, this note will serve as a discharge from care along with the most recent update on progress.

## 2020-02-27 ENCOUNTER — HOSPITAL ENCOUNTER (OUTPATIENT)
Dept: PHYSICAL THERAPY | Age: 20
Setting detail: THERAPIES SERIES
Discharge: HOME | End: 2020-02-27
Payer: COMMERCIAL

## 2020-02-27 PROCEDURE — 97110 THERAPEUTIC EXERCISES: CPT

## 2020-02-27 PROCEDURE — 97140 MANUAL THERAPY 1/> REGIONS: CPT

## 2020-02-27 PROCEDURE — 97112 NEUROMUSCULAR REEDUCATION: CPT

## 2020-02-27 NOTE — FLOWSHEET NOTE
EMERGENCY DEPARTMENT HISTORY AND PHYSICAL EXAM    Date: 3/16/2018  Patient Name: Jessica Chau    History of Presenting Illness     Chief Complaint   Patient presents with    Abdominal Pain    Nausea         History Provided By: Patient    Chief Complaint: abd pain  Duration: 2 Weeks  Timing:  Gradual, Constant and Waxing and Waning  Location: mid-abdominal, \"around my belly button\"  Quality: Aching  Severity: 8 out of 10  Modifying Factors: none, not changed with medication or food  Associated Symptoms: nausea, subjective fever      Additional History (Context): Jessica Chau is a 24 y.o. female with depression, asthma, migraine, ADHD, OAB, fibromyalgia, h/o appy and yecenia, who presents with mid-abd pain x 2 weeks. States has had similar pain on and off x 4 years, diagnosed approx 1 year ago by GI with \"fibromyalgia in my stomach\"; told this \"would flare up on and off, but to come in if it's severe\". States currently taking gabapentin and motrin intermittently without relief. Also notes nausea without vomiting, and subjective fever. Admits to some congestion, but states this is 2/2 allergies. This feels just like previous episodes of chronic/recurrent abd pain. Denies confirmed fever, vomiting, diarrhea, cough, SOB, CP, urinary sx's, vaginal sx's. LMP \"I have an IUD, so I don't have them\"; states not sexually active. PCP: Teresa Liu MD    Current Outpatient Prescriptions   Medication Sig Dispense Refill    ondansetron (ZOFRAN ODT) 4 mg disintegrating tablet Take 1-2 tablets every 6-8 hours as needed for nausea and vomiting. Indications: nausea 10 Tab 0    traMADol (ULTRAM) 50 mg tablet Take 1 Tab by mouth every six (6) hours as needed for Pain. Max Daily Amount: 200 mg. Indications: Pain 6 Tab 0    nitrofurantoin, macrocrystal-monohydrate, (MACROBID) 100 mg capsule Take 1 Cap by mouth two (2) times a day for 5 days.  Indications: BACTERIAL URINARY TRACT INFECTION 10 Cap 0    [place] [address] [address]  Phone: [phone] Fax: [phone]    Physical Therapy Treatment Note/ Progress Report:     Date:  2020    Patient Name:  Zenobia Dickerson    :  2000  MRN: [id number]  Restrictions/Precautions:    Medical/Treatment Diagnosis Information:  · Diagnosis: M25.852 Hip impingement syndrome; M70.62 Trochanteric bursitis of L hip  · Treatment Diagnosis: L hip pain, decreased LLE strength  Insurance/Certification information:  PT Insurance Information: BCBS; 61 visits; 90/10%  Physician Information:  Referring Practitioner: Dr. Stew Jones of care signed (Y/N):     Date of Patient follow up with Physician: 2020     Progress Report: []  Yes  [x]  No     Date Range for reporting period:  Beginnin2020  Ending:      Progress report due (10 Rx/or 30 days whichever is less): 7612     Recertification due (POC duration/ or 90 days whichever is less): 3/31/2020     Visit # Insurance Allowable Auth Needed   8 56 []Yes   [x]No     Latex Allergy:  [x]NO      []YES  Preferred Language for Healthcare:   [x]English       []other:  Functional Scale: LEFS  Date assessed: 2020    Pain level:  0/10     SUBJECTIVE:  Patient reports that she is still having a little IT band tightness down the lateral leg but hip is feeling a lot better.     OBJECTIVE: See eval   Observation:    Test measurements:      RESTRICTIONS/PRECAUTIONS: n/a    Exercises/Interventions:     Therapeutic Ex (20289)   Min: 30 Resistance Sets/sec Reps Notes/CUES   Retro Stepper/BIKE  5'     Alter G       BFR       [place] March       3 way SLR       SAQ       Clam ABD  2 10 Add band next visit   Hip Ext /table       BOSU fwd/side lunge       BOSU bridge w/ band pull down yellow 2 8    Leg Press Con  Ecc  70#  50# 2  2 10  10    Cybex HS curl       TKE       Glute side walks Maroon + orange at ankle 2 10    RDL 10# meloxicam (MOBIC) 15 mg tablet Take 1 Tab by mouth daily (with breakfast). 30 Tab 1    levothyroxine (SYNTHROID) 75 mcg tablet   0    LORazepam (ATIVAN) 0.5 mg tablet   0    azithromycin (ZITHROMAX) 500 mg tab take 1 tablet by mouth PER WEEK  0    busPIRone (BUSPAR) 5 mg tablet take 1 tablet by mouth twice a day  0    promethazine-codeine (PHENERGAN WITH CODEINE) 6.25-10 mg/5 mL syrup take 5 milliliters by mouth every 6 hours if needed  0    amoxicillin-clavulanate (AUGMENTIN) 875-125 mg per tablet take 1 tablet by mouth every 12 hours for 7 days  0    ARIPiprazole (ABILIFY) 2 mg tablet       budesonide (PULMICORT) 0.5 mg/2 mL nbsp inhale contents of 1 vial in nebulizer twice a day  0    doxycycline (MONODOX) 50 mg capsule       montelukast (SINGULAIR) 10 mg tablet   1    benzonatate (TESSALON) 200 mg capsule take 1 capsule by mouth every 8 hours if needed  0    tretinoin (RETIN-A) 0.025 % topical cream apply EVERY NIGHT TO FACE AS TOLERATED  0    ketoconazole (NIZORAL) 2 % shampoo LATHER AND LEAVE ON FOR 5 MINUTES AND RINSE. DAILY FOR FOUR WEEKS  0    doxycycline (ADOXA) 100 mg tablet   0    ammonium lactate (AMLACTIN) 12 % topical cream apply to affected area twice a day if needed  0    LORazepam (ATIVAN) 1 mg tablet take 1 tablet by mouth MAY REPEAT IN 1 HOUR IF NEEDED  0    albuterol-ipratropium (DUO-NEB) 2.5 mg-0.5 mg/3 ml nebu inhale contents of 1 vial every 4 to 6 hours in nebulizer if needed  0    VYVANSE 40 mg capsule take 1 capsule by mouth every morning  0    gabapentin (NEURONTIN) 300 mg capsule take 1 capsule by mouth three times a day  0    lithium carbonate 300 mg capsule Take 600 mg by mouth two (2) times daily (with meals).  albuterol (PROVENTIL HFA, VENTOLIN HFA, PROAIR HFA) 90 mcg/actuation inhaler Take 2 Puffs by inhalation every four (4) hours as needed for Wheezing or Shortness of Breath (or cough).  1 Inhaler 1       Past History     Past Medical History:  Past Medical (78410) Gait Re-education- Provided training and instruction to the patient for proper LE, core and proximal hip recruitment and positioning and eccentric body weight control with ambulation re-education including up and down stairs     Home Exercise Program:    [x] (62289) Reviewed/Progressed HEP activities related to strengthening, flexibility, endurance, ROM of core, proximal hip and LE for functional self-care, mobility, lifting and ambulation/stair navigation   [] (80816)Reviewed/Progressed HEP activities related to improving balance, coordination, kinesthetic sense, posture, motor skill, proprioception of core, proximal hip and LE for self care, mobility, lifting, and ambulation/stair navigation      Manual Treatments:  PROM / STM / Oscillations-Mobs:  G-I, II, III, IV (PA's, Inf., Post.)  [x] (44174) Provided manual therapy to mobilize LE, proximal hip and/or LS spine soft tissue/joints for the purpose of modulating pain, promoting relaxation,  increasing ROM, reducing/eliminating soft tissue swelling/inflammation/restriction, improving soft tissue extensibility and allowing for proper ROM for normal function with self care, mobility, lifting and ambulation. Modalities:     [] GAME READY (VASO)- for significant edema, swelling, pain control. Charges:  Timed Code Treatment Minutes: 45   Total Treatment Minutes: 45      [] EVAL (LOW) 26063 (typically 20 minutes face-to-face)  [] EVAL (MOD) 75898 (typically 30 minutes face-to-face)  [] EVAL (HIGH) 49601 (typically 45 minutes face-to-face)  [] RE-EVAL     [x] RA(56444) x 1    [] IONTO (77269)  [x] NMR (57710) x 1    [] VASO (68537)  [x] Manual (89793) x 1    [] Other  [] TA (62741)x     [] Mech Traction (50711)  [] ES(attended) (72278)     [] ES (un) (90312):       GOALS:   Patient stated goal: \"be able to jump/spin without pain\"  [] Progressing: [] Met: [x] Not Met: [] Adjusted    Therapist goals for Patient:   Short Term Goals:  To be achieved in: 2 History:   Diagnosis Date    ADHD (attention deficit hyperactivity disorder)     Asthma     Depression     Fibromyalgia 05/2017    Migraine     Overactive bladder     Sinus infection        Past Surgical History:  Past Surgical History:   Procedure Laterality Date    APPENDECTOMY      HX CHOLECYSTECTOMY      HX HEENT      ear tubes bilaterally    HX UROLOGICAL  01/18/2018    Cysto, inj Botox 200 units with Dr. Tesha Aparicio [facility]       Family History:  Family History   Problem Relation Age of Onset    Diabetes Maternal Aunt     Hypertension Maternal Grandmother     Diabetes Maternal Grandmother     Hypertension Maternal Grandfather        Social History:  Social History   Substance Use Topics    Smoking status: Never Smoker    Smokeless tobacco: Never Used    Alcohol use 0.6 oz/week     1 Cans of beer per week       Allergies: Allergies   Allergen Reactions    Pineapple Unknown (comments)     Facial numbness, swelling    Topamax [Topiramate] Other (comments)     Reports flu like symptoms         Review of Systems   Review of Systems   Constitutional: Positive for fever (subjective). Negative for activity change, appetite change and chills. HENT: Positive for congestion. Negative for sore throat. Respiratory: Negative for cough. Gastrointestinal: Positive for abdominal pain, nausea and vomiting. Negative for constipation and diarrhea. Genitourinary: Negative for dysuria, flank pain, frequency, hematuria, pelvic pain, urgency, vaginal bleeding, vaginal discharge and vaginal pain. Musculoskeletal: Negative for myalgias. Skin: Negative for color change and rash. All other systems reviewed and are negative.     All Other Systems Negative  Physical Exam     Vitals:    03/16/18 1519   BP: 114/78   Pulse: (!) 110   Resp: 18   Temp: 98.4 °F (36.9 °C)   SpO2: 100%   Weight: 105.2 kg (232 lb)   Height: 5' 3\" (1.6 m)     Physical Exam   Constitutional: She appears weeks  1. Independent in HEP and progression per patient tolerance, in order to prevent re-injury. [] Progressing: [] Met: [x] Not Met: [] Adjusted  2. Patient will have a decrease in pain to facilitate improvement in movement, function, and ADLs as indicated by Functional Deficits. [] Progressing: [] Met: [x] Not Met: [] Adjusted    Long Term Goals: To be achieved in: 8 weeks  1. Disability index score of 20% or less for the LEFS to assist with reaching prior level of function. [] Progressing: [] Met: [x] Not Met: [] Adjusted  2. Patient will demonstrate increased AROM to [employer] w/o increased pain  to allow for proper joint functioning as indicated by patients Functional Deficits. [] Progressing: [] Met: [x] Not Met: [] Adjusted  3. Patient will demonstrate an increase in Strength to good proximal hip strength and control, within 5lb HHD in LE to allow for proper functional mobility as indicated by patients Functional Deficits. [] Progressing: [] Met: [x] Not Met: [] Adjusted  4. Patient will return to walking functional activities without increased symptoms or restriction. [] Progressing: [] Met: [x] Not Met: [] Adjusted  5. Pt will be able to perform a jump with  Minimal sx and no restrictions. [] Progressing: [] Met: [x] Not Met: [] Adjusted    ASSESSMENT:  Good tolerance to treatment. No complaints of pain with any exercises but appropriate and equal fatigue at end of session.       Return to Play: (if applicable)   []  Stage 1: Intro to Strength   []  Stage 2: Return to Run and Strength   []  Stage 3: Return to Jump and Strength   []  Stage 4: Dynamic Strength and Agility   []  Stage 5: Sport Specific Training     []  Ready to Return to Play, Meets All Above Stages   []  Not Ready for Return to Sports   Comments:            Treatment/Activity Tolerance:  [x] Patient tolerated treatment well [] Patient limited by fatique  [] Patient limited by pain  [] Patient limited by other medical complications  [] well-developed and well-nourished. HENT:   Head: Normocephalic and atraumatic. Eyes: Conjunctivae are normal. No scleral icterus. Neck: Normal range of motion. Neck supple. No JVD present. Cardiovascular: Normal rate, regular rhythm and intact distal pulses. Pulmonary/Chest: Effort normal. No respiratory distress. Abdominal: Soft. Normal appearance and bowel sounds are normal. There is tenderness (mild-moderate, diffuse upper abd) in the epigastric area. There is no rigidity, no rebound, no guarding, no CVA tenderness, no tenderness at McBurney's point and negative Scott's sign. Appearance nl, soft  NABS x 4  Mild-moderate diffuse upper abd TTP without guarding, rigidity, rebound  No lower abd pain, no CVAT     Musculoskeletal: Normal range of motion. Neurological: She is alert. Skin: Skin is warm and dry. Psychiatric: Judgment and thought content normal.   Nursing note and vitals reviewed.     Diagnostic Study Results     Labs -     Recent Results (from the past 12 hour(s))   URINALYSIS W/ RFLX MICROSCOPIC    Collection Time: 03/16/18  3:40 PM   Result Value Ref Range    Color YELLOW      Appearance CLOUDY      Specific gravity 1.018 1.005 - 1.030      pH (UA) 5.5 5.0 - 8.0      Protein NEGATIVE  NEG mg/dL    Glucose NEGATIVE  NEG mg/dL    Ketone NEGATIVE  NEG mg/dL    Bilirubin NEGATIVE  NEG      Blood MODERATE (A) NEG      Urobilinogen 0.2 0.2 - 1.0 EU/dL    Nitrites NEGATIVE  NEG      Leukocyte Esterase LARGE (A) NEG     HCG URINE, QL    Collection Time: 03/16/18  3:40 PM   Result Value Ref Range    HCG urine, QL NEGATIVE  NEG     CBC WITH AUTOMATED DIFF    Collection Time: 03/16/18  3:40 PM   Result Value Ref Range    WBC 14.4 (H) 4.6 - 13.2 K/uL    RBC 5.12 4.20 - 5.30 M/uL    HGB 12.3 12.0 - 16.0 g/dL    HCT 39.5 35.0 - 45.0 %    MCV 77.1 74.0 - 97.0 FL    MCH 24.0 24.0 - 34.0 PG    MCHC 31.1 31.0 - 37.0 g/dL    RDW 17.4 (H) 11.6 - 14.5 %    PLATELET 549 217 - 301 K/uL    MPV 10.8 9.2 - 11.8 FL    NEUTROPHILS 77 (H) 40 - 73 %    LYMPHOCYTES 17 (L) 21 - 52 %    MONOCYTES 5 3 - 10 %    EOSINOPHILS 1 0 - 5 %    BASOPHILS 0 0 - 2 %    ABS. NEUTROPHILS 11.2 (H) 1.8 - 8.0 K/UL    ABS. LYMPHOCYTES 2.4 0.9 - 3.6 K/UL    ABS. MONOCYTES 0.7 0.05 - 1.2 K/UL    ABS. EOSINOPHILS 0.1 0.0 - 0.4 K/UL    ABS. BASOPHILS 0.0 0.0 - 0.06 K/UL    DF AUTOMATED     LIPASE    Collection Time: 03/16/18  3:40 PM   Result Value Ref Range    Lipase 135 73 - 152 U/L   METABOLIC PANEL, COMPREHENSIVE    Collection Time: 03/16/18  3:40 PM   Result Value Ref Range    Sodium 142 136 - 145 mmol/L    Potassium 3.8 3.5 - 5.5 mmol/L    Chloride 104 100 - 108 mmol/L    CO2 29 21 - 32 mmol/L    Anion gap 9 3.0 - 18 mmol/L    Glucose 96 74 - 99 mg/dL    BUN 9 7.0 - 18 MG/DL    Creatinine 0.71 0.6 - 1.3 MG/DL    BUN/Creatinine ratio 13 12 - 20      GFR est AA >60 >60 ml/min/1.73m2    GFR est non-AA >60 >60 ml/min/1.73m2    Calcium 9.4 8.5 - 10.1 MG/DL    Bilirubin, total 0.3 0.2 - 1.0 MG/DL    ALT (SGPT) 21 13 - 56 U/L    AST (SGOT) 15 15 - 37 U/L    Alk. phosphatase 113 45 - 117 U/L    Protein, total 7.2 6.4 - 8.2 g/dL    Albumin 3.9 3.4 - 5.0 g/dL    Globulin 3.3 2.0 - 4.0 g/dL    A-G Ratio 1.2 0.8 - 1.7     URINE MICROSCOPIC ONLY    Collection Time: 03/16/18  3:40 PM   Result Value Ref Range    WBC 36 to 50 0 - 4 /hpf    RBC 4 to 10 0 - 5 /hpf    Epithelial cells 2+ 0 - 5 /lpf    Bacteria 4+ (A) NEG /hpf       Radiologic Studies -   No orders to display     CT Results  (Last 48 hours)    None        CXR Results  (Last 48 hours)    None            Medical Decision Making   I am the first provider for this patient. I reviewed the vital signs, available nursing notes, past medical history, past surgical history, family history and social history. Vital Signs-Reviewed the patient's vital signs.       Pulse Oximetry Analysis - 100% on RA    Records Reviewed: Nursing Notes and Old Medical Records    Procedures:  Procedures    Provider Other:     Overall Progression Towards Functional goals/ Treatment Progress Update:  [] Patient is progressing as expected towards functional goals listed. [] Progression is slowed due to complexities/Impairments listed. [] Progression has been slowed due to co-morbidities. [x] Plan just implemented, too soon to assess goals progression <30days   [] Goals require adjustment due to lack of progress  [] Patient is not progressing as expected and requires additional follow up with physician  [] Other    Prognosis for POC: [x] Good [] Fair  [] Poor    Patient requires continued skilled intervention: [x] Yes  [] No        PLAN: Continue functional strength training and hip mobility as tolerated to improve eccentric landing control and overall proximal hip strength and stability  [x] Continue per plan of care [] Alter current plan (see comments)  [] Plan of care initiated [] Hold pending MD visit [] Discharge    Electronically signed by: Shankar Del Toro PT      Note: If patient does not return for scheduled/recommended follow up visits, this note will serve as a discharge from care along with the most recent update on progress. Notes (Medical Decision Making):   24 y.o. presenting with abd pain and nausea, similar to previous episodes of chronic abd pain. Has had appy and yecenia in the past, so appendicitis and cholelithiasis/cystitis not in differential.  Pt does have significant urological history, so basic labs done to r/o urinary involvement. VSS without fever; initially mildly tachycardic, but pt nontoxic, not septic. Upper-mid abd pain and nausea resolved after toradol and zofran. CMP, lipase WNL. CBC with mild WBC elevation, pt with h/o chronic sinusitis but no recent steroids; exam and presentation not c/w pyelo or urinary calculus. UA + large leuks, moderate blood, 4+ bacteria, WBC; culture pending; some epithelial cells so possible contamination, but will treat with Macrobid. At reeval, pt feeling well and would like to be discharged home. MED RECONCILIATION:  No current facility-administered medications for this encounter. Current Outpatient Prescriptions   Medication Sig    ondansetron (ZOFRAN ODT) 4 mg disintegrating tablet Take 1-2 tablets every 6-8 hours as needed for nausea and vomiting. Indications: nausea    traMADol (ULTRAM) 50 mg tablet Take 1 Tab by mouth every six (6) hours as needed for Pain. Max Daily Amount: 200 mg. Indications: Pain    nitrofurantoin, macrocrystal-monohydrate, (MACROBID) 100 mg capsule Take 1 Cap by mouth two (2) times a day for 5 days. Indications: BACTERIAL URINARY TRACT INFECTION    meloxicam (MOBIC) 15 mg tablet Take 1 Tab by mouth daily (with breakfast).     levothyroxine (SYNTHROID) 75 mcg tablet     LORazepam (ATIVAN) 0.5 mg tablet     azithromycin (ZITHROMAX) 500 mg tab take 1 tablet by mouth PER WEEK    busPIRone (BUSPAR) 5 mg tablet take 1 tablet by mouth twice a day    promethazine-codeine (PHENERGAN WITH CODEINE) 6.25-10 mg/5 mL syrup take 5 milliliters by mouth every 6 hours if needed    amoxicillin-clavulanate (AUGMENTIN) 875-125 mg per tablet take 1 tablet by mouth every 12 hours for 7 days    ARIPiprazole (ABILIFY) 2 mg tablet     budesonide (PULMICORT) 0.5 mg/2 mL nbsp inhale contents of 1 vial in nebulizer twice a day    doxycycline (MONODOX) 50 mg capsule     montelukast (SINGULAIR) 10 mg tablet     benzonatate (TESSALON) 200 mg capsule take 1 capsule by mouth every 8 hours if needed    tretinoin (RETIN-A) 0.025 % topical cream apply EVERY NIGHT TO FACE AS TOLERATED    ketoconazole (NIZORAL) 2 % shampoo LATHER AND LEAVE ON FOR 5 MINUTES AND RINSE. DAILY FOR FOUR WEEKS    doxycycline (ADOXA) 100 mg tablet     ammonium lactate (AMLACTIN) 12 % topical cream apply to affected area twice a day if needed    LORazepam (ATIVAN) 1 mg tablet take 1 tablet by mouth MAY REPEAT IN 1 HOUR IF NEEDED    albuterol-ipratropium (DUO-NEB) 2.5 mg-0.5 mg/3 ml nebu inhale contents of 1 vial every 4 to 6 hours in nebulizer if needed    VYVANSE 40 mg capsule take 1 capsule by mouth every morning    gabapentin (NEURONTIN) 300 mg capsule take 1 capsule by mouth three times a day    lithium carbonate 300 mg capsule Take 600 mg by mouth two (2) times daily (with meals).  albuterol (PROVENTIL HFA, VENTOLIN HFA, PROAIR HFA) 90 mcg/actuation inhaler Take 2 Puffs by inhalation every four (4) hours as needed for Wheezing or Shortness of Breath (or cough). Disposition:  Home    DISCHARGE NOTE:     Pt has been reexamined. Feeling much improved, sx's resolved. Patient has no new complaints, changes, or physical findings. Care plan outlined and precautions discussed. Results of exam, labs were reviewed with the patient. All medications were reviewed with the patient; will d/c home with macrobid, zofran, tramadol. All of pt's questions and concerns were addressed. Patient was instructed and agrees to follow up with PCP, urology, GI, as well as to return to the ED upon further deterioration. Patient is ready to go home.     Follow-up Information     Follow up With Details Comments Contact Info    Linda Duran MD Schedule an appointment as soon as possible for a visit in 1 day As needed [address]      Letcher Severe, MD Schedule an appointment as soon as possible for a visit As needed [address]  [address] [address]  [phone] [address] [facility] EMERGENCY DEPT Go to As needed [facility]  [phone]          Current Discharge Medication List      START taking these medications    Details   traMADol (ULTRAM) 50 mg tablet Take 1 Tab by mouth every six (6) hours as needed for Pain. Max Daily Amount: 200 mg. Indications: Pain  Qty: 6 Tab, Refills: 0    Associated Diagnoses: Chronic abdominal pain      nitrofurantoin, macrocrystal-monohydrate, (MACROBID) 100 mg capsule Take 1 Cap by mouth two (2) times a day for 5 days. Indications: BACTERIAL URINARY TRACT INFECTION  Qty: 10 Cap, Refills: 0         CONTINUE these medications which have CHANGED    Details   ondansetron (ZOFRAN ODT) 4 mg disintegrating tablet Take 1-2 tablets every 6-8 hours as needed for nausea and vomiting. Indications: nausea  Qty: 10 Tab, Refills: 0           Diagnosis     Clinical Impression:   1. Acute UTI    2. Nausea without vomiting    3.  Chronic abdominal pain

## 2020-03-02 ENCOUNTER — HOSPITAL ENCOUNTER (OUTPATIENT)
Dept: PHYSICAL THERAPY | Age: 20
Setting detail: THERAPIES SERIES
Discharge: HOME | End: 2020-03-02
Payer: COMMERCIAL

## 2020-03-02 PROCEDURE — 97140 MANUAL THERAPY 1/> REGIONS: CPT

## 2020-03-02 PROCEDURE — 97110 THERAPEUTIC EXERCISES: CPT

## 2020-03-02 PROCEDURE — 97112 NEUROMUSCULAR REEDUCATION: CPT

## 2020-03-02 NOTE — FLOWSHEET NOTE
Lunge- lateral slideboard 2 8    Slide HS eccentrics       Step ups/ecc step down 6'' 2 8 Slow/controlled w/ eccentric focus   Swissball wall rolls- in SLS- hip drive       Quad hip ext/wall-ball rolls       Supine piriformis stretch  30'' 3           Manual Intervention (70349)  Min: 15       Knee mobs/PROM  IASTM HGPro 15'  L IT band, L hip ERs, L glute med   Patella Mobs       Ankle mobs       LAD  2'  Grade 2-3          NMR re-education (16767)  Min: 15    CUES NEEDED   Bosu bridge  2 10 Cues to prevent lumbar extension and excessive anterior pelvis tilt   Prone TKE       Prone froggers  5'' 10    G. Med activation- clams       Hip Ext full ROM/ G. Activation       Bosu Bal and Prop- G Med [place] ball 2 5 Requires cues to fire glute med to prevent pelvic drop on stance leg   Single leg stance/Balance/Prop       Bosu Retro G. Med act       Prone Hip froggers- sliders/elevated       Foam rolling   3'  Straight leg/bent leg   Therapeutic Activity (35981)  Min:       Ladders       Plyos       Dynamic Balance                                Therapeutic Exercise and NMR EXR  [x] (34457) Provided verbal/tactile cueing for activities related to strengthening, flexibility, endurance, ROM for improvements in LE, proximal hip, and core control with self care, mobility, lifting, ambulation. [x] (09955) Provided verbal/tactile cueing for activities related to improving balance, coordination, kinesthetic sense, posture, motor skill, proprioception  to assist with LE, proximal hip, and core control in self care, mobility, lifting, ambulation and eccentric single leg control.      NMR and Therapeutic Activities:    [x] (19421 or 75165) Provided verbal/tactile cueing for activities related to improving balance, coordination, kinesthetic sense, posture, motor skill, proprioception and motor activation to allow for proper function of core, proximal hip and LE with self care and ADLs and functional mobility.   [] (14531) Gait Re-education- Provided training and instruction to the patient for proper LE, core and proximal hip recruitment and positioning and eccentric body weight control with ambulation re-education including up and down stairs     Home Exercise Program:    [x] (57765) Reviewed/Progressed HEP activities related to strengthening, flexibility, endurance, ROM of core, proximal hip and LE for functional self-care, mobility, lifting and ambulation/stair navigation   [] (41354)Reviewed/Progressed HEP activities related to improving balance, coordination, kinesthetic sense, posture, motor skill, proprioception of core, proximal hip and LE for self care, mobility, lifting, and ambulation/stair navigation      Manual Treatments:  PROM / STM / Oscillations-Mobs:  G-I, II, III, IV (PA's, Inf., Post.)  [x] (78951) Provided manual therapy to mobilize LE, proximal hip and/or LS spine soft tissue/joints for the purpose of modulating pain, promoting relaxation,  increasing ROM, reducing/eliminating soft tissue swelling/inflammation/restriction, improving soft tissue extensibility and allowing for proper ROM for normal function with self care, mobility, lifting and ambulation. Modalities:     [] GAME READY (VASO)- for significant edema, swelling, pain control. Charges:  Timed Code Treatment Minutes: 45   Total Treatment Minutes: 45      [] EVAL (LOW) 20223 (typically 20 minutes face-to-face)  [] EVAL (MOD) 81474 (typically 30 minutes face-to-face)  [] EVAL (HIGH) 35628 (typically 45 minutes face-to-face)  [] RE-EVAL     [x] YT(20234) x 1    [] IONTO (39365)  [x] NMR (26936) x 1    [] VASO (69496)  [x] Manual (25856) x 1    [] Other  [] TA (02752)x     [] Mech Traction (02822)  [] ES(attended) (20830)     [] ES (un) (08654):       GOALS:   Patient stated goal: \"be able to jump/spin without pain\"  [] Progressing: [] Met: [x] Not Met: [] Adjusted    Therapist goals for Patient:   Short Term Goals: To be achieved in: 2 weeks  1. Independent in HEP and progression per patient tolerance, in order to prevent re-injury. [] Progressing: [] Met: [x] Not Met: [] Adjusted  2. Patient will have a decrease in pain to facilitate improvement in movement, function, and ADLs as indicated by Functional Deficits. [] Progressing: [] Met: [x] Not Met: [] Adjusted    Long Term Goals: To be achieved in: 8 weeks  1. Disability index score of 20% or less for the LEFS to assist with reaching prior level of function. [] Progressing: [] Met: [x] Not Met: [] Adjusted  2. Patient will demonstrate increased AROM to [facility] w/o increased pain  to allow for proper joint functioning as indicated by patients Functional Deficits. [] Progressing: [] Met: [x] Not Met: [] Adjusted  3. Patient will demonstrate an increase in Strength to good proximal hip strength and control, within 5lb HHD in LE to allow for proper functional mobility as indicated by patients Functional Deficits. [] Progressing: [] Met: [x] Not Met: [] Adjusted  4. Patient will return to walking functional activities without increased symptoms or restriction. [] Progressing: [] Met: [x] Not Met: [] Adjusted  5. Pt will be able to perform a jump with  Minimal sx and no restrictions. [] Progressing: [] Met: [x] Not Met: [] Adjusted    ASSESSMENT:  Good tolerance to treatment. Appropriately fatigued at conclusion with no increased pain. Decreased stiffness with STM and hip mobs.        Return to Play: (if applicable)   []  Stage 1: Intro to Strength   []  Stage 2: Return to Run and Strength   []  Stage 3: Return to Jump and Strength   []  Stage 4: Dynamic Strength and Agility   []  Stage 5: Sport Specific Training     []  Ready to Return to Play, Meets All Above Stages   []  Not Ready for Return to Sports   Comments:            Treatment/Activity Tolerance:  [x] Patient tolerated treatment well [] Patient limited by fatique  [] Patient limited by pain  [] Patient limited by other medical complications  [] Other:     Overall Progression Towards Functional goals/ Treatment Progress Update:  [] Patient is progressing as expected towards functional goals listed. [] Progression is slowed due to complexities/Impairments listed. [] Progression has been slowed due to co-morbidities. [x] Plan just implemented, too soon to assess goals progression <30days   [] Goals require adjustment due to lack of progress  [] Patient is not progressing as expected and requires additional follow up with physician  [] Other    Prognosis for POC: [x] Good [] Fair  [] Poor    Patient requires continued skilled intervention: [x] Yes  [] No        PLAN: Continue functional strength training and hip mobility as tolerated to improve eccentric landing control and overall proximal hip strength and stability  [x] Continue per plan of care [] Alter current plan (see comments)  [] Plan of care initiated [] Hold pending MD visit [] Discharge    Electronically signed by: Godwin Ramon PTA      Note: If patient does not return for scheduled/recommended follow up visits, this note will serve as a discharge from care along with the most recent update on progress.

## 2020-03-05 ENCOUNTER — HOSPITAL ENCOUNTER (OUTPATIENT)
Dept: PHYSICAL THERAPY | Age: 20
Setting detail: THERAPIES SERIES
Discharge: HOME | End: 2020-03-05
Payer: COMMERCIAL

## 2020-03-05 PROCEDURE — 97110 THERAPEUTIC EXERCISES: CPT

## 2020-03-05 PROCEDURE — 97140 MANUAL THERAPY 1/> REGIONS: CPT

## 2020-03-05 NOTE — FLOWSHEET NOTE
[place] [address] [place] [address]  Phone: [phone] Fax: [phone]    Physical Therapy Treatment Note/ Progress Report:     Date:  3/5/2020    Patient Name:  Tommy Cruz    :  2000  MRN: [id number]  Restrictions/Precautions:    Medical/Treatment Diagnosis Information:  · Diagnosis: M25.852 Hip impingement syndrome; M70.62 Trochanteric bursitis of L hip  · Treatment Diagnosis: L hip pain, decreased LLE strength  Insurance/Certification information:  PT Insurance Information: BCBS; 61 visits; 90/10%  Physician Information:  Referring Practitioner: Dr. Crisostomo [place] of care signed (Y/N):     Date of Patient follow up with Physician: 2020     Progress Report: [x]  Yes  []  No     Date Range for reporting period:  Beginnin2020  Ending:      Progress report due (10 Rx/or 30 days whichever is less): 5137     Recertification due (POC duration/ or 90 days whichever is less): 3/31/2020     Visit # Insurance Allowable Auth Needed   10 56 []Yes   [x]No     Latex Allergy:  [x]NO      []YES  Preferred Language for Healthcare:   [x]English       []other:  Functional Scale: LEFS 55/80  Date assessed: 2020    Pain level:  0/10     SUBJECTIVE:  Patient reports that her hip is feeling good, has been skating and doing tricks and states she only feels a little pain right over greater trochanter. Does have a big competition this weekend and has to drive 5 hours for it so is a little nervous how it will hold up and asks if we can keep things a little lighter today to prevent fatigue.      OBJECTIVE: Focused todays session on improving tissue quality and mobility w/ light glute activation exercises   Observation:    Test measurements:    3/5/2020         Strength  LEFT RIGHT   HIP Flexors 5 5   HIP Abductors 4 4   HIP Ext 4 4   Knee EXT (quad) 5 5           RESTRICTIONS/PRECAUTIONS:

## 2020-03-09 ENCOUNTER — APPOINTMENT (OUTPATIENT)
Dept: PHYSICAL THERAPY | Age: 20
End: 2020-03-09
Payer: COMMERCIAL

## 2020-03-10 ENCOUNTER — HOSPITAL ENCOUNTER (OUTPATIENT)
Dept: PHYSICAL THERAPY | Age: 20
Setting detail: THERAPIES SERIES
Discharge: HOME | End: 2020-03-10
Payer: COMMERCIAL

## 2020-03-10 PROCEDURE — 97140 MANUAL THERAPY 1/> REGIONS: CPT

## 2020-03-10 PROCEDURE — 97112 NEUROMUSCULAR REEDUCATION: CPT

## 2020-03-10 NOTE — FLOWSHEET NOTE
[place] [address] [place] [address]  Phone: [phone] Fax: [phone]    Physical Therapy Treatment Note/ Progress Report:     Date:  3/10/2020    Patient Name:  Johnnie Boyd    :  2000  MRN: [id number]  Restrictions/Precautions:    Medical/Treatment Diagnosis Information:  · Diagnosis: M25.852 Hip impingement syndrome; M70.62 Trochanteric bursitis of L hip  · Treatment Diagnosis: L hip pain, decreased LLE strength  Insurance/Certification information:  PT Insurance Information: BCBS; 61 visits; 90/10%  Physician Information:  Referring Practitioner: Dr. Dexter Sam of care signed (Y/N):     Date of Patient follow up with Physician: 2020     Progress Report: []  Yes  [x]  No     Date Range for reporting period:  Beginnin2020  Ending:      Progress report due (10 Rx/or 30 days whichever is less): 6/3/3908     Recertification due (POC duration/ or 90 days whichever is less): 3/31/2020     Visit # Insurance Allowable Auth Needed   56 45 []Yes   [x]No     Latex Allergy:  [x]NO      []YES  Preferred Language for Healthcare:   [x]English       []other:  Functional Scale: LEFS 57/80  Date assessed: 3/10/2020    Pain level:  0/10     SUBJECTIVE:  Patient reports that her hip is a little angry today after her skating competition this past weekend. States she skated 4 times and mostly had pain when performing jumps off of left leg. States she took some Advil which helped most of her pain but still has some lingering directly on greater trochanter and just behind it.        OBJECTIVE: Focused todays session on improving tissue quality and mobility w/ light glute activation exercises   Observation:    Test measurements:    3/5/2020         Strength  LEFT RIGHT   HIP Flexors 5 5   HIP Abductors 4 4   HIP Ext 4 4   Knee EXT (quad) 5 5           RESTRICTIONS/PRECAUTIONS: n/a    Exercises/Interventions:

## 2020-03-12 ENCOUNTER — APPOINTMENT (OUTPATIENT)
Dept: PHYSICAL THERAPY | Age: 20
End: 2020-03-12
Payer: COMMERCIAL

## 2020-03-16 ENCOUNTER — HOSPITAL ENCOUNTER (OUTPATIENT)
Dept: PHYSICAL THERAPY | Age: 20
Setting detail: THERAPIES SERIES
Discharge: HOME | End: 2020-03-16
Payer: COMMERCIAL

## 2020-03-16 PROCEDURE — 97140 MANUAL THERAPY 1/> REGIONS: CPT

## 2020-03-16 PROCEDURE — 97110 THERAPEUTIC EXERCISES: CPT

## 2020-03-16 NOTE — FLOWSHEET NOTE
Stepper/BIKE  5'     Alter G       BFR       [place]       3 way SLR       SAQ       Clam ABD  2 10 Add band next visit   Hip Ext /table       BOSU fwd/side lunge       BOSU bridge w/ band pull down yellow 2 8    Leg Press Con  Ecc  80#  50# 2  2 10  10    Cybex HS curl       TKE       Glute side walks Maroon at knees 2 10    RDL    Slide Lunge- lateral    Slide HS eccentrics       Step ups/ecc step down Swissball wall rolls- in SLS- hip drive       Quad hip ext/wall-ball rolls       Supine piriformis stretch  30'' 3           Manual Intervention (24629)  Min: 20       Knee mobs/PROM  IASTM HGPro 10'  L IT band, L hip ERs, L glute med   Patella Mobs       Ankle mobs       Hip belt mobs 5'  Lateral, lateral w/ IR bias, inferior; grade 2-3 as tolerated   LAD  5'  Grade 2-3          NMR re-education (61954)  Min: 15    CUES NEEDED   Bosu bridge Prone TKE Prone froggers G. Med activation- retro skater w/ error augmentation [id number] 8 Hip Ext full ROM/ G. Activation Bosu Bal and Prop- G Med Single leg stance/Balance/Prop Banded hip hiinge - DL  SL bilateral purple 1  1 10  10 Prone Hip froggers- sliders/elevated Foam rolling  Therapeutic Activity (33506)  Min:       Ladders       Plyos       Dynamic Balance                                Therapeutic Exercise and NMR EXR  [x] (70964) Provided verbal/tactile cueing for activities related to strengthening, flexibility, endurance, ROM for improvements in LE, proximal hip, and core control with self care, mobility, lifting, ambulation. [x] (98797) Provided verbal/tactile cueing for activities related to improving balance, coordination, kinesthetic sense, posture, motor skill, proprioception  to assist with LE, proximal hip, and core control in self care, mobility, lifting, ambulation and eccentric single leg control.      NMR and Therapeutic Activities:    [x] (33141 or 62148) Provided verbal/tactile cueing for activities related to improving balance, coordination, Stage 3: Return to Jump and Strength   []  Stage 4: Dynamic Strength and Agility   []  Stage 5: Sport Specific Training     []  Ready to Return to Play, Meets All Above Stages   []  Not Ready for Return to Sports   Comments:            Treatment/Activity Tolerance:  [x] Patient tolerated treatment well [] Patient limited by fatique  [] Patient limited by pain  [] Patient limited by other medical complications  [] Other:     Overall Progression Towards Functional goals/ Treatment Progress Update:  [x] Patient is progressing as expected towards functional goals listed. [] Progression is slowed due to complexities/Impairments listed. [] Progression has been slowed due to co-morbidities. [] Plan just implemented, too soon to assess goals progression <30days   [] Goals require adjustment due to lack of progress  [] Patient is not progressing as expected and requires additional follow up with physician  [] Other    Prognosis for POC: [x] Good [] Fair  [] Poor    Patient requires continued skilled intervention: [x] Yes  [] No        PLAN: Continue functional strength training and hip mobility as tolerated to improve eccentric landing control and overall proximal hip strength and stability  [x] Continue per plan of care [] Alter current plan (see comments)  [] Plan of care initiated [] Hold pending MD visit [] Discharge    Electronically signed by: Jaja Christianson PTA      Note: If patient does not return for scheduled/recommended follow up visits, this note will serve as a discharge from care along with the most recent update on progress.